# Patient Record
Sex: MALE | Race: BLACK OR AFRICAN AMERICAN | Employment: FULL TIME | URBAN - METROPOLITAN AREA
[De-identification: names, ages, dates, MRNs, and addresses within clinical notes are randomized per-mention and may not be internally consistent; named-entity substitution may affect disease eponyms.]

---

## 2018-07-16 ENCOUNTER — HOSPITAL ENCOUNTER (EMERGENCY)
Age: 28
Discharge: HOME | End: 2018-07-16
Attending: INTERNAL MEDICINE
Payer: COMMERCIAL

## 2018-07-16 VITALS
BODY MASS INDEX: 24.69 KG/M2 | HEART RATE: 64 BPM | SYSTOLIC BLOOD PRESSURE: 129 MMHG | DIASTOLIC BLOOD PRESSURE: 80 MMHG | RESPIRATION RATE: 16 BRPM | WEIGHT: 176.37 LBS | HEIGHT: 71 IN | OXYGEN SATURATION: 100 % | TEMPERATURE: 98 F

## 2018-07-16 DIAGNOSIS — S51.012A LACERATION OF LEFT ELBOW, INITIAL ENCOUNTER: Primary | ICD-10-CM

## 2018-07-16 PROCEDURE — 75810000293 HC SIMP/SUPERF WND  RPR

## 2018-07-16 PROCEDURE — 77030002986 HC SUT PROL J&J -A

## 2018-07-16 PROCEDURE — 99282 EMERGENCY DEPT VISIT SF MDM: CPT

## 2018-07-16 PROCEDURE — 74011250636 HC RX REV CODE- 250/636: Performed by: NURSE PRACTITIONER

## 2018-07-16 PROCEDURE — 90715 TDAP VACCINE 7 YRS/> IM: CPT | Performed by: NURSE PRACTITIONER

## 2018-07-16 PROCEDURE — 90471 IMMUNIZATION ADMIN: CPT

## 2018-07-16 RX ADMIN — TETANUS TOXOID, REDUCED DIPHTHERIA TOXOID AND ACELLULAR PERTUSSIS VACCINE, ADSORBED 0.5 ML: 5; 2.5; 8; 8; 2.5 SUSPENSION INTRAMUSCULAR at 16:21

## 2018-07-16 NOTE — ED PROVIDER NOTES
[address]  EMERGENCY DEPARTMENT HISTORY AND PHYSICAL EXAM    Date: 7/16/2018  Patient Name: Pedro George  YOB: 1990  Medical Record Number: [id number]     History of Presenting Illness     Chief Complaint   Patient presents with    Laceration       History Provided By: Patient    Chief Complaint: Laceration  Duration: 1.5 Hours  Timing:  Acute  Location: Left elbow   Quality: Sharp  Severity: 9 out of 10  Modifying Factors: No relieving or worsening factors  Associated Symptoms: Left elbow pain    Additional History (Context):   3:35 PM   Pedro George is a 32 y.o. male who presents to the emergency department C/O laceration to left elbow area x 1.5 hours. Associated symptoms include left elbow pain. Reports he reached to grab something and cut his arm on a sharp metal garage door track. Bleeding controlled PTA. Tetanus is not UTD. Pt denies swelling, bruising, and any other Sx or complaints. PCP: None    Past History     Past Medical History:  No past medical history on file. Past Surgical History:  No past surgical history on file. Family History:  No family history on file. Social History:  Social History   Substance Use Topics    Smoking status: Current Every Day Smoker     Packs/day: 1.00    Smokeless tobacco: Not on file    Alcohol use No       Allergies:  No Known Allergies      Review of Systems     Review of Systems   Musculoskeletal: Positive for arthralgias (left elbow). Negative for joint swelling. Skin: Positive for wound (lac to left elbow). Negative for color change. All other systems reviewed and are negative. Physical Exam     Vitals:    07/16/18 1446   BP: 129/80   Pulse: 64   Resp: 16   Temp: 98 °F (36.7 °C)   SpO2: 100%   Weight: 80 kg (176 lb 5.9 oz)   Height: 5' 11\" (1.803 m)     Physical Exam   Constitutional: He is oriented to person, place, and time. He appears well-developed and well-nourished.    HENT:   Head: Normocephalic and atraumatic. Eyes: Conjunctivae are normal.   Neck: Normal range of motion. Pulmonary/Chest: Effort normal.   Musculoskeletal: Normal range of motion. Left elbow: He exhibits laceration. 2 cm superficial linear laceration to left elbow, no active bleeding  Denies paresthesia   Full ROM   Neurological: He is alert and oriented to person, place, and time. Skin: Skin is warm and dry. Nursing note and vitals reviewed. Diagnostic Study Results     Labs -   No results found for this or any previous visit (from the past 12 hour(s)). Radiologic Studies -   No orders to display     Medications Given in the ED:  Medications   diph,Pertuss(AC),Tet Vac-PF (BOOSTRIX) suspension 0.5 mL (0.5 mL IntraMUSCular Given 7/16/18 1621)        Medical Decision Making     I am the first provider for this patient. I reviewed the vital signs, available nursing notes, past medical history, past surgical history, family history and social history. Records Reviewed: Nursing Notes    Vital Signs-Reviewed the patient's vital signs. Pulse Oximetry Analysis - Normal 100% on RA        Provider Notes (Medical Decision Making): Laceration sutured without difficulty. Tetanus was updated. Patient understands return precautions and is offering no questions or concerns at this time    Procedures:  Wound Repair  Date/Time: 7/16/2018 3:59 PM  Performed by: NPPreparation: skin prepped with Shur-Clens and sterile field established  Pre-procedure re-eval: Immediately prior to the procedure, the patient was reevaluated and found suitable for the planned procedure and any planned medications.   Location details: left elbow  Wound length:2.5 cm or less (2 cm)  Anesthesia: local infiltration    Anesthesia:  Local Anesthetic: lidocaine 1% without epinephrine  Foreign bodies: no foreign bodies  Irrigation solution: saline  Irrigation method: syringe  Debridement: none  Skin closure: Prolene (5-0)  Number of sutures: 4  Technique: interrupted and simple  Approximation: close  Patient tolerance: Patient tolerated the procedure well with no immediate complications  My total time at bedside, performing this procedure was 1-15 minutes. ED Course:   3:35 PM Initial assessment performed. Pt and/or pt's family are aware of the plan of care and are in agreement. Diagnosis and Disposition       Discharge Note:  4:09 PM  Dima Carter's  results have been reviewed with him. He has been counseled regarding his diagnosis, treatment, and plan. He verbally conveys understanding and agreement of the signs, symptoms, diagnosis, treatment and prognosis and additionally agrees to follow up as discussed. He also agrees with the care-plan and conveys that all of his questions have been answered. I have also provided discharge instructions for him that include: educational information regarding their diagnosis and treatment, and list of reasons why they would want to return to the ED prior to their follow-up appointment, should his condition change. He has been provided with education for proper emergency department utilization. Clinical Impression:    1. Laceration of left elbow, initial encounter        PLAN:  1. D/C Home  2. There are no discharge medications for this patient. 3.   Follow-up Information     Follow up With Details Comments Contact Info    [facility] EMERGENCY DEPT Go in 1 week For suture removal in 7-10 days [address] [facility]    [facility] EMERGENCY DEPT Go to As needed, if symptoms worsen [address] [id number]  [phone]        _______________________________    Attestations: This note is prepared by Neha Dennis, acting as Scribe for [facility] . [facility]:  The scribe's documentation has been prepared under my direction and personally reviewed by me in its entirety.   I confirm that the note above accurately reflects all work, treatment, procedures, and medical decision making performed by me.  _______________________________

## 2018-07-16 NOTE — DISCHARGE INSTRUCTIONS
Cuts Closed With Stitches: Care Instructions  Your Care Instructions  A cut can happen anywhere on your body. The doctor used stitches to close the cut. Using stitches also helps the cut heal and reduces scarring. Sometimes pieces of tape called Steri-Strips are put over the stitches. If the cut went deep and through the skin, the doctor may have put in two layers of stitches. The deeper layer brings the deep part of the cut together. These stitches will dissolve and don't need to be removed. The stitches in the upper layer are the ones you see on the cut. You will probably have a bandage over the stitches. You will need to have the stitches removed, usually in 7 to 14 days. The doctor has checked you carefully, but problems can develop later. If you notice any problems or new symptoms, get medical treatment right away. Follow-up care is a key part of your treatment and safety. Be sure to make and go to all appointments, and call your doctor if you are having problems. It's also a good idea to know your test results and keep a list of the medicines you take. How can you care for yourself at home? · Keep the cut dry for the first 24 to 48 hours. After this, you can shower if your doctor okays it. Pat the cut dry. · Don't soak the cut, such as in a bathtub. Your doctor will tell you when it's safe to get the cut wet. · If your doctor told you how to care for your cut, follow your doctor's instructions. If you did not get instructions, follow this general advice:  ¨ After the first 24 to 48 hours, wash around the cut with clean water 2 times a day. Don't use hydrogen peroxide or alcohol, which can slow healing. ¨ You may cover the cut with a thin layer of petroleum jelly, such as Vaseline, and a nonstick bandage. ¨ Apply more petroleum jelly and replace the bandage as needed. · Prop up the sore area on a pillow anytime you sit or lie down during the next 3 days.  Try to keep it above the level of your heart. This will help reduce swelling. · Avoid any activity that could cause your cut to reopen. · Do not remove the stitches on your own. Your doctor will tell you when to come back to have the stitches removed. · Leave Steri-Strips on until they fall off. · Be safe with medicines. Read and follow all instructions on the label. ¨ If the doctor gave you a prescription medicine for pain, take it as prescribed. ¨ If you are not taking a prescription pain medicine, ask your doctor if you can take an over-the-counter medicine. When should you call for help? Call your doctor now or seek immediate medical care if:    · You have new pain, or your pain gets worse.     · The skin near the cut is cold or pale or changes color.     · You have tingling, weakness, or numbness near the cut.     · The cut starts to bleed, and blood soaks through the bandage. Oozing small amounts of blood is normal.     · You have trouble moving the area near the cut.     · You have symptoms of infection, such as:  ¨ Increased pain, swelling, warmth, or redness around the cut. ¨ Red streaks leading from the cut. ¨ Pus draining from the cut. ¨ A fever.    Watch closely for changes in your health, and be sure to contact your doctor if:    · The cut reopens.     · You do not get better as expected. Where can you learn more? Go to http://franco-darío.info/. Enter R217 in the search box to learn more about \"Cuts Closed With Stitches: Care Instructions. \"  Current as of: November 20, 2017  Content Version: 11.7  © 1617-0392 [employer]. Care instructions adapted under license by [employer] (which disclaims liability or warranty for this information). If you have questions about a medical condition or this instruction, always ask your healthcare professional. [address] any warranty or liability for your use of this information.

## 2018-07-16 NOTE — LETTER
[facility] [place] 
[facility] 
[place] [id number] 
[phone] Work Note Date: 7/16/2018 To Whom It May concern: 
 
Jagjit Staff was seen and treated today in the emergency room by the following provider(s): 
Attending Provider: Rafael Byrne MD 
Nurse Practitioner: Naty Venegas NP. Jagjit Staff may return to work on 7/18/18. Sincerely, Chi MCKEONP-BC

## 2019-05-21 ENCOUNTER — HOSPITAL ENCOUNTER (EMERGENCY)
Age: 29
Discharge: HOME | End: 2019-05-21
Attending: EMERGENCY MEDICINE
Payer: COMMERCIAL

## 2019-05-21 ENCOUNTER — APPOINTMENT (OUTPATIENT)
Dept: GENERAL RADIOLOGY | Age: 29
End: 2019-05-21
Attending: PHYSICIAN ASSISTANT
Payer: COMMERCIAL

## 2019-05-21 VITALS
DIASTOLIC BLOOD PRESSURE: 78 MMHG | RESPIRATION RATE: 16 BRPM | TEMPERATURE: 98.1 F | BODY MASS INDEX: 23.7 KG/M2 | SYSTOLIC BLOOD PRESSURE: 118 MMHG | HEIGHT: 72 IN | HEART RATE: 84 BPM | WEIGHT: 175 LBS | OXYGEN SATURATION: 97 %

## 2019-05-21 DIAGNOSIS — S90.111A CONTUSION OF RIGHT GREAT TOE WITHOUT DAMAGE TO NAIL, INITIAL ENCOUNTER: Primary | ICD-10-CM

## 2019-05-21 DIAGNOSIS — S90.121A CONTUSION OF SECOND TOE OF RIGHT FOOT, INITIAL ENCOUNTER: ICD-10-CM

## 2019-05-21 PROCEDURE — 99282 EMERGENCY DEPT VISIT SF MDM: CPT

## 2019-05-21 PROCEDURE — 73630 X-RAY EXAM OF FOOT: CPT

## 2019-05-21 PROCEDURE — 99281 EMR DPT VST MAYX REQ PHY/QHP: CPT

## 2019-05-21 NOTE — ED PROVIDER NOTES
EMERGENCY DEPARTMENT HISTORY AND PHYSICAL EXAM    Date: 5/21/2019  Patient Name: Jagjit Contreras    History of Presenting Illness     Chief Complaint   Patient presents with    Toe Pain         History Provided By: Patient    Chief Complaint: toe pain    HPI(Context):   2:26 PM  Jagjit Contreras is a 25 y.o. male with PMHX of bells palsy who presents to the emergency department C/O right foot pain. Associated sxs include swelling to R first and second toe. Pain x 2 days. Pain worse with ambulation. Better with rest. Pt feels he may have injured toes but he is unsure of exact mechanism. Pt denies numbness, weakness, and any other sxs or complaints. Reports he was dx with [place] Palsy recently and on prednisone    PCP: None        Past History     Past Medical History:  Past Medical History:   Diagnosis Date    Bell's palsy        Past Surgical History:  History reviewed. No pertinent surgical history. Family History:  History reviewed. No pertinent family history. Social History:  Social History     Tobacco Use    Smoking status: Current Every Day Smoker     Packs/day: 0.25   Substance Use Topics    Alcohol use: Not Currently    Drug use: Yes     Types: Marijuana       Allergies:  No Known Allergies      Review of Systems   Review of Systems   Musculoskeletal: Positive for arthralgias and joint swelling. Skin: Negative for color change. Neurological: Negative for weakness and numbness. All other systems reviewed and are negative. Physical Exam     Vitals:    05/21/19 1351 05/21/19 1355   BP:  118/78   Pulse:  84   Resp:  16   Temp:  98.1 °F (36.7 °C)   SpO2: 100% 97%   Weight: 79.4 kg (175 lb)    Height: 6' (1.829 m)      Physical Exam   Constitutional: He is oriented to person, place, and time. He appears well-developed and well-nourished. No distress. AA male in NAD. Alert. Appears comfortable. HENT:   Head: Normocephalic and atraumatic.    Right Ear: External ear normal.   Left Ear: External ear normal.   Nose: Nose normal.   Eyes: Conjunctivae are normal.   Neck: Normal range of motion. Cardiovascular: Normal rate, regular rhythm, normal heart sounds and intact distal pulses. Exam reveals no gallop and no friction rub. No murmur heard. Pulses:       Dorsalis pedis pulses are 2+ on the right side, and 2+ on the left side. Posterior tibial pulses are 2+ on the right side, and 2+ on the left side. Pulmonary/Chest: Effort normal and breath sounds normal. No accessory muscle usage. No tachypnea. No respiratory distress. He has no decreased breath sounds. He has no wheezes. He has no rhonchi. He has no rales. Musculoskeletal:        Right lower leg: He exhibits no tenderness, no bony tenderness, no swelling and no edema. Right foot: There is tenderness and swelling. There is normal range of motion and no deformity. Feet:    Neurological: He is alert and oriented to person, place, and time. Skin: Skin is warm and dry. He is not diaphoretic. Psychiatric: He has a normal mood and affect. Judgment normal.   Nursing note and vitals reviewed. Diagnostic Study Results     Labs -   No results found for this or any previous visit (from the past 12 hour(s)). Radiologic Studies -   2:26 PM  RADIOLOGY FINDINGS  R foot X-ray shows NAP  Pending review by Radiologist  Recorded by Ophelia Wang PA-C    XR FOOT RT MIN 3 V    (Results Pending)     CT Results  (Last 48 hours)    None        CXR Results  (Last 48 hours)    None          Medications given in the ED-  Medications - No data to display      Medical Decision Making   I am the first provider for this patient. I reviewed the vital signs, available nursing notes, past medical history, past surgical history, family history and social history. Vital Signs-Reviewed the patient's vital signs.     Pulse Oximetry Analysis - 97% on RA     Records Reviewed: Nursing Notes    Provider Notes (Medical Decision Making): sprain, strain, fx, contusion, ligamentous, gout, OA, tendonitis    Procedures:  Procedures    ED Course:   2:26 PM Initial assessment performed. The patients presenting problems have been discussed, and they are in agreement with the care plan formulated and outlined with them. I have encouraged them to ask questions as they arise throughout their visit. Diagnosis and Disposition       Imaging unremarkable. NVI. Will tx as contusion. Doubt cellulitis. No evidence of septic joint. Reasons to RTED discussed with pt. All questions answered. Pt feels comfortable going home at this time. Pt expressed understanding and he agrees with plan. 1. Contusion of right great toe without damage to nail, initial encounter    2. Contusion of second toe of right foot, initial encounter        PLAN:  1. D/C Home  2. There are no discharge medications for this patient. 3.   Follow-up Information     Follow up With Specialties Details Why Contact Info    Austin Greenberg, DPM Podiatry   [address] [id number] [facility] [address]  [phone]      [facility] EMERGENCY DEPT Emergency Medicine  As needed, If symptoms worsen [address] [place] [id number]  [phone]        _______________________________    Attestations: This note is prepared by Cristian Nixon PA-C.  _______________________________          Please note that this dictation was completed with [employer], the [employer] voice recognition software. Quite often unanticipated grammatical, syntax, homophones, and other interpretive errors are inadvertently transcribed by the computer software. Please disregard these errors. Please excuse any errors that have escaped final proofreading.